# Patient Record
Sex: MALE | Race: OTHER
[De-identification: names, ages, dates, MRNs, and addresses within clinical notes are randomized per-mention and may not be internally consistent; named-entity substitution may affect disease eponyms.]

---

## 2018-06-09 ENCOUNTER — HOSPITAL ENCOUNTER (EMERGENCY)
Dept: HOSPITAL 14 - H.ER | Age: 38
LOS: 1 days | Discharge: HOME | End: 2018-06-10
Payer: SELF-PAY

## 2018-06-09 DIAGNOSIS — F10.129: Primary | ICD-10-CM

## 2018-06-10 VITALS
OXYGEN SATURATION: 100 % | TEMPERATURE: 97.9 F | HEART RATE: 84 BPM | SYSTOLIC BLOOD PRESSURE: 132 MMHG | DIASTOLIC BLOOD PRESSURE: 84 MMHG | RESPIRATION RATE: 16 BRPM

## 2018-06-10 NOTE — ED PDOC
HPI: Psych/Substance Abuse


Time Seen by Provider: 06/10/18 00:08


Chief Complaint (Nursing): Alcohol Ingestion


Chief Complaint (Provider): Alcohol Intoxication


ED Caveat: Acuity of Condition, Intoxicated


History Per: EMS


History/Exam Limitations: intoxication, language barrier


Onset/Duration Of Symptoms: Hrs (three)


Current Symptoms Are (Timing): Still Present


Suicide/Self Injury Attempted (Context): None (Pt presents to the ED via EMS 

for public intoxication; )





Past Medical History


Reviewed: Historical Data, Nursing Documentation, Vital Signs


Vital Signs: 





 Last Vital Signs











Temp  98.0 F   06/09/18 23:40


 


Pulse  88   06/09/18 23:40


 


Resp  18   06/09/18 23:40


 


BP  131/103 H  06/09/18 23:40


 


Pulse Ox  99   06/09/18 23:40














- Family History


Family History: States: Unknown Family Hx





- Allergies


Allergies/Adverse Reactions: 


 Allergies











Allergy/AdvReac Type Severity Reaction Status Date / Time


 


No Known Allergies Allergy   Verified 06/09/18 23:40














Review of Systems


ROS Statement: Except As Marked, All Systems Reviewed And Found Negative


Review Of Systems: ROS cannot be obtained secondary to pt's inabilty to answer 

questions.


Psych: Positive for: Other (intoxicated)





Physical Exam





- Reviewed


Nursing Documentation Reviewed: Yes


Vital Signs Reviewed: Yes





- Physical Exam


Appears: Positive for: Uncomfortable


Head Exam: Positive for: ATRAUMATIC, NORMAL INSPECTION, NORMOCEPHALIC


Skin: Positive for: Normal Color, Warm, Dry


Cardiovascular/Chest: Positive for: Regular Rate, Rhythm


Respiratory: Positive for: Normal Breath Sounds


Pulses-Carotid (L): 2+


Pulses-Carotid (R): 2+


Pulses-Radial (L): 2+


Pulses-Radial (R): 2+





- ECG


O2 Sat by Pulse Oximetry: 99





Medical Decision Making


Medical Decision Making: 





Pt discharged [facility] friend who arrived at ED to pick pt up and take home; Pt gait 

is strong, straight and able prior to discharge





Disposition





- Clinical Impression


Clinical Impression: 


 Alcohol abuse, Alcohol abuse with intoxication








- Patient ED Disposition


Is Patient to be Admitted: No


Doctor Will See Patient In The: Office





- Disposition


Referrals: 


Alcoholics Anonymous [Outside]


Disposition Time: 01:13


Condition: STABLE


Instructions:  Alcohol Abuse and Alcoholism (DC), Effects of Alcohol on Your 

Health


Forms:  [facility] (English), [facility] (Georgian)